# Patient Record
Sex: FEMALE | Race: WHITE | NOT HISPANIC OR LATINO | URBAN - METROPOLITAN AREA
[De-identification: names, ages, dates, MRNs, and addresses within clinical notes are randomized per-mention and may not be internally consistent; named-entity substitution may affect disease eponyms.]

---

## 2022-11-29 ENCOUNTER — OUTPATIENT (OUTPATIENT)
Dept: OUTPATIENT SERVICES | Facility: HOSPITAL | Age: 32
LOS: 1 days | End: 2022-11-29

## 2022-11-29 ENCOUNTER — APPOINTMENT (OUTPATIENT)
Dept: ULTRASOUND IMAGING | Facility: CLINIC | Age: 32
End: 2022-11-29

## 2022-11-29 PROCEDURE — 76856 US EXAM PELVIC COMPLETE: CPT | Mod: 26

## 2022-11-29 PROCEDURE — 76700 US EXAM ABDOM COMPLETE: CPT | Mod: 26

## 2022-11-29 PROCEDURE — 76536 US EXAM OF HEAD AND NECK: CPT | Mod: 26

## 2022-12-06 RX ORDER — SODIUM CHLORIDE 9 MG/ML
1000 INJECTION, SOLUTION INTRAVENOUS
Refills: 0 | Status: DISCONTINUED | OUTPATIENT
Start: 2022-12-07 | End: 2022-12-07

## 2022-12-06 RX ORDER — FENTANYL CITRATE 50 UG/ML
25 INJECTION INTRAVENOUS
Refills: 0 | Status: DISCONTINUED | OUTPATIENT
Start: 2022-12-07 | End: 2022-12-07

## 2022-12-06 NOTE — ASU PATIENT PROFILE, ADULT - NS PREOP UNDERSTANDS INFO
No solid food/dairy/candy/gum or after 9:30pm tonight, water before 04:30am tomorrow, reminded patient to bring photo ID/insurance card, escort must have photo ID. No jewelries/valuables/contact lens; no smoking/alcohol/drug use. Address and call back number given./yes

## 2022-12-07 ENCOUNTER — TRANSCRIPTION ENCOUNTER (OUTPATIENT)
Age: 32
End: 2022-12-07

## 2022-12-07 ENCOUNTER — OUTPATIENT (OUTPATIENT)
Dept: OUTPATIENT SERVICES | Facility: HOSPITAL | Age: 32
LOS: 1 days | Discharge: HOME | End: 2022-12-07

## 2022-12-07 ENCOUNTER — RESULT REVIEW (OUTPATIENT)
Age: 32
End: 2022-12-07

## 2022-12-07 VITALS
RESPIRATION RATE: 15 BRPM | DIASTOLIC BLOOD PRESSURE: 72 MMHG | HEART RATE: 72 BPM | OXYGEN SATURATION: 98 % | TEMPERATURE: 98 F | SYSTOLIC BLOOD PRESSURE: 118 MMHG

## 2022-12-07 VITALS
HEIGHT: 67 IN | RESPIRATION RATE: 17 BRPM | OXYGEN SATURATION: 100 % | HEART RATE: 81 BPM | WEIGHT: 163.14 LBS | SYSTOLIC BLOOD PRESSURE: 131 MMHG | DIASTOLIC BLOOD PRESSURE: 81 MMHG | TEMPERATURE: 98 F

## 2022-12-07 DIAGNOSIS — Z90.49 ACQUIRED ABSENCE OF OTHER SPECIFIED PARTS OF DIGESTIVE TRACT: Chronic | ICD-10-CM

## 2022-12-07 LAB — SARS-COV-2 RNA SPEC QL NAA+PROBE: NEGATIVE — SIGNIFICANT CHANGE UP

## 2022-12-07 PROCEDURE — 88300 SURGICAL PATH GROSS: CPT | Mod: 26

## 2022-12-07 DEVICE — SURGIFLO HEMOSTATIC MATRIX KIT: Type: IMPLANTABLE DEVICE | Status: FUNCTIONAL

## 2022-12-07 DEVICE — STAPLER SEPTAL ENTACT: Type: IMPLANTABLE DEVICE | Status: FUNCTIONAL

## 2022-12-07 RX ORDER — ACETAMINOPHEN 500 MG
1000 TABLET ORAL ONCE
Refills: 0 | Status: COMPLETED | OUTPATIENT
Start: 2022-12-07 | End: 2022-12-07

## 2022-12-07 RX ORDER — L.ACIDOPH/B.ANIMALIS/B.LONGUM 15B CELL
2 CAPSULE ORAL
Qty: 0 | Refills: 0 | DISCHARGE

## 2022-12-07 RX ORDER — APREPITANT 80 MG/1
40 CAPSULE ORAL ONCE
Refills: 0 | Status: COMPLETED | OUTPATIENT
Start: 2022-12-07 | End: 2022-12-07

## 2022-12-07 RX ORDER — OXYCODONE HYDROCHLORIDE 5 MG/1
5 TABLET ORAL ONCE
Refills: 0 | Status: DISCONTINUED | OUTPATIENT
Start: 2022-12-07 | End: 2022-12-07

## 2022-12-07 RX ADMIN — OXYCODONE HYDROCHLORIDE 5 MILLIGRAM(S): 5 TABLET ORAL at 13:10

## 2022-12-07 RX ADMIN — APREPITANT 40 MILLIGRAM(S): 80 CAPSULE ORAL at 08:52

## 2022-12-07 RX ADMIN — Medication 1000 MILLIGRAM(S): at 08:52

## 2022-12-07 RX ADMIN — FENTANYL CITRATE 25 MICROGRAM(S): 50 INJECTION INTRAVENOUS at 12:33

## 2022-12-07 RX ADMIN — SODIUM CHLORIDE 100 MILLILITER(S): 9 INJECTION, SOLUTION INTRAVENOUS at 12:06

## 2022-12-07 RX ADMIN — OXYCODONE HYDROCHLORIDE 5 MILLIGRAM(S): 5 TABLET ORAL at 13:40

## 2022-12-07 RX ADMIN — FENTANYL CITRATE 25 MICROGRAM(S): 50 INJECTION INTRAVENOUS at 12:18

## 2022-12-07 NOTE — ASU DISCHARGE PLAN (ADULT/PEDIATRIC) - NS MD DC FALL RISK RISK
For information on Fall & Injury Prevention, visit: [email or web address] OR  [email or web address] OR  https://www.cdc.gov/steadi/patient.html

## 2022-12-07 NOTE — BRIEF OPERATIVE NOTE - NSICDXBRIEFPREOP_GEN_ALL_CORE_FT
PRE-OP DIAGNOSIS:  Nasal septal deviation 07-Dec-2022 11:44:44  Joon Birmingham  Nasal turbinate hypertrophy 07-Dec-2022 11:44:52  Joon Birmingham

## 2022-12-07 NOTE — BRIEF OPERATIVE NOTE - NSICDXBRIEFPROCEDURE_GEN_ALL_CORE_FT
PROCEDURES:  Septoplasty, turbinate reduction 07-Dec-2022 11:44:00  Joon Birmingham  Bilateral turbinectomy 07-Dec-2022 11:44:11  Joon Birmingham

## 2022-12-07 NOTE — ASU DISCHARGE PLAN (ADULT/PEDIATRIC) - CARE PROVIDER_API CALL
Joon Birmingham)  Otolaryngology Otolaryngology  Head and Neck Surgery  [address] [place] [address], [address] [place] [address]  Phone: [phone]  Fax: [phone]  Follow Up Time:

## 2022-12-07 NOTE — BRIEF OPERATIVE NOTE - NSICDXBRIEFPOSTOP_GEN_ALL_CORE_FT
POST-OP DIAGNOSIS:  Deviated septum 07-Dec-2022 11:45:00  Joon Birmingham  Nasal turbinate hypertrophy 07-Dec-2022 11:45:05  Joon Birmingham

## 2022-12-08 ENCOUNTER — EMERGENCY (EMERGENCY)
Facility: HOSPITAL | Age: 32
LOS: 1 days | Discharge: HOME | End: 2022-12-08
Admitting: EMERGENCY MEDICINE

## 2022-12-08 VITALS
HEIGHT: 67 IN | SYSTOLIC BLOOD PRESSURE: 131 MMHG | HEART RATE: 96 BPM | RESPIRATION RATE: 18 BRPM | DIASTOLIC BLOOD PRESSURE: 84 MMHG | OXYGEN SATURATION: 99 % | TEMPERATURE: 98 F

## 2022-12-08 VITALS
TEMPERATURE: 98 F | DIASTOLIC BLOOD PRESSURE: 62 MMHG | SYSTOLIC BLOOD PRESSURE: 109 MMHG | HEART RATE: 78 BPM | RESPIRATION RATE: 17 BRPM | OXYGEN SATURATION: 96 %

## 2022-12-08 DIAGNOSIS — Z90.89 ACQUIRED ABSENCE OF OTHER ORGANS: ICD-10-CM

## 2022-12-08 DIAGNOSIS — R04.0 EPISTAXIS: ICD-10-CM

## 2022-12-08 DIAGNOSIS — Z90.49 ACQUIRED ABSENCE OF OTHER SPECIFIED PARTS OF DIGESTIVE TRACT: Chronic | ICD-10-CM

## 2022-12-08 DIAGNOSIS — Z98.890 OTHER SPECIFIED POSTPROCEDURAL STATES: ICD-10-CM

## 2022-12-08 PROBLEM — Z78.9 OTHER SPECIFIED HEALTH STATUS: Chronic | Status: ACTIVE | Noted: 2022-12-07

## 2022-12-08 LAB
APTT BLD: 32 SEC — SIGNIFICANT CHANGE UP (ref 27.5–35.5)
BASOPHILS # BLD AUTO: 0.02 K/UL — SIGNIFICANT CHANGE UP (ref 0–0.2)
BASOPHILS NFR BLD AUTO: 0.1 % — SIGNIFICANT CHANGE UP (ref 0–2)
EOSINOPHIL # BLD AUTO: 0 K/UL — SIGNIFICANT CHANGE UP (ref 0–0.5)
EOSINOPHIL NFR BLD AUTO: 0 % — SIGNIFICANT CHANGE UP (ref 0–6)
HCT VFR BLD CALC: 34.5 % — SIGNIFICANT CHANGE UP (ref 34.5–45)
HGB BLD-MCNC: 11.7 G/DL — SIGNIFICANT CHANGE UP (ref 11.5–15.5)
IMM GRANULOCYTES NFR BLD AUTO: 0.4 % — SIGNIFICANT CHANGE UP (ref 0–0.9)
INR BLD: 1.08 — SIGNIFICANT CHANGE UP (ref 0.88–1.16)
LYMPHOCYTES # BLD AUTO: 1.47 K/UL — SIGNIFICANT CHANGE UP (ref 1–3.3)
LYMPHOCYTES # BLD AUTO: 7 % — LOW (ref 13–44)
MCHC RBC-ENTMCNC: 32.1 PG — SIGNIFICANT CHANGE UP (ref 27–34)
MCHC RBC-ENTMCNC: 33.9 GM/DL — SIGNIFICANT CHANGE UP (ref 32–36)
MCV RBC AUTO: 94.5 FL — SIGNIFICANT CHANGE UP (ref 80–100)
MONOCYTES # BLD AUTO: 1.01 K/UL — HIGH (ref 0–0.9)
MONOCYTES NFR BLD AUTO: 4.8 % — SIGNIFICANT CHANGE UP (ref 2–14)
NEUTROPHILS # BLD AUTO: 18.41 K/UL — HIGH (ref 1.8–7.4)
NEUTROPHILS NFR BLD AUTO: 87.7 % — HIGH (ref 43–77)
NRBC # BLD: 0 /100 WBCS — SIGNIFICANT CHANGE UP (ref 0–0)
PLATELET # BLD AUTO: 256 K/UL — SIGNIFICANT CHANGE UP (ref 150–400)
PROTHROM AB SERPL-ACNC: 12.7 SEC — SIGNIFICANT CHANGE UP (ref 10.5–13.4)
RBC # BLD: 3.65 M/UL — LOW (ref 3.8–5.2)
RBC # FLD: 12.1 % — SIGNIFICANT CHANGE UP (ref 10.3–14.5)
WBC # BLD: 21 K/UL — HIGH (ref 3.8–10.5)
WBC # FLD AUTO: 21 K/UL — HIGH (ref 3.8–10.5)

## 2022-12-08 PROCEDURE — 99284 EMERGENCY DEPT VISIT MOD MDM: CPT

## 2022-12-08 RX ORDER — AMPICILLIN SODIUM AND SULBACTAM SODIUM 250; 125 MG/ML; MG/ML
3 INJECTION, POWDER, FOR SUSPENSION INTRAMUSCULAR; INTRAVENOUS ONCE
Refills: 0 | Status: COMPLETED | OUTPATIENT
Start: 2022-12-08 | End: 2022-12-08

## 2022-12-08 RX ORDER — TRANEXAMIC ACID 100 MG/ML
1000 INJECTION, SOLUTION INTRAVENOUS ONCE
Refills: 0 | Status: COMPLETED | OUTPATIENT
Start: 2022-12-08 | End: 2022-12-08

## 2022-12-08 RX ORDER — OXYMETAZOLINE HYDROCHLORIDE 0.5 MG/ML
1 SPRAY NASAL ONCE
Refills: 0 | Status: COMPLETED | OUTPATIENT
Start: 2022-12-08 | End: 2022-12-08

## 2022-12-08 RX ORDER — ONDANSETRON 8 MG/1
4 TABLET, FILM COATED ORAL ONCE
Refills: 0 | Status: COMPLETED | OUTPATIENT
Start: 2022-12-08 | End: 2022-12-08

## 2022-12-08 RX ORDER — TRANEXAMIC ACID 100 MG/ML
5 INJECTION, SOLUTION INTRAVENOUS ONCE
Refills: 0 | Status: COMPLETED | OUTPATIENT
Start: 2022-12-08 | End: 2022-12-08

## 2022-12-08 RX ADMIN — ONDANSETRON 4 MILLIGRAM(S): 8 TABLET, FILM COATED ORAL at 05:55

## 2022-12-08 RX ADMIN — TRANEXAMIC ACID 220 MILLIGRAM(S): 100 INJECTION, SOLUTION INTRAVENOUS at 05:13

## 2022-12-08 RX ADMIN — OXYMETAZOLINE HYDROCHLORIDE 1 SPRAY(S): 0.5 SPRAY NASAL at 07:20

## 2022-12-08 RX ADMIN — AMPICILLIN SODIUM AND SULBACTAM SODIUM 3 GRAM(S): 250; 125 INJECTION, POWDER, FOR SUSPENSION INTRAMUSCULAR; INTRAVENOUS at 06:55

## 2022-12-08 RX ADMIN — TRANEXAMIC ACID 1000 MILLIGRAM(S): 100 INJECTION, SOLUTION INTRAVENOUS at 05:53

## 2022-12-08 RX ADMIN — AMPICILLIN SODIUM AND SULBACTAM SODIUM 200 GRAM(S): 250; 125 INJECTION, POWDER, FOR SUSPENSION INTRAMUSCULAR; INTRAVENOUS at 06:13

## 2022-12-08 RX ADMIN — TRANEXAMIC ACID 5 MILLILITER(S): 100 INJECTION, SOLUTION INTRAVENOUS at 05:40

## 2022-12-08 NOTE — ED PROVIDER NOTE - PATIENT PORTAL LINK FT
You can access the FollowMyHealth Patient Portal offered by [facility] by registering at the following website: [email or web address]. By joining [email or web address]’s FollowMyHealth portal, you will also be able to view your health information using other applications (apps) compatible with our system.

## 2022-12-08 NOTE — ED ADULT NURSE NOTE - OBJECTIVE STATEMENT
Pt is here s/p nasal surgery for which she was released from [facility]'s PACU despite still having some bleeding as well as bleeding via the eyes after her procedure. Her nasal wound reopened, causing uncontrolled bleeding. Pt is otherwise feeling well, no lightheadedness (endorses a HA), no dizziness, visual changes, CP, SOB.

## 2022-12-08 NOTE — ED PROVIDER NOTE - CLINICAL SUMMARY MEDICAL DECISION MAKING FREE TEXT BOX
32 yo F POD1 s/p septoplasty w b/l inferior turbinectomy at [facility] by Dr. Birmingham, noted persistent bleeding post op and at home. Afebrile, non toxic appearing, no h/o bleeding/clotting disorder, exam noted persistent venous bleeding from b/l nare, L>R, prior silicone packing prematurely fell out. labs here with stable H/H, elevated wbc to 21K likely post op and pt already been started on amoxicillin or augmentin. coags wnl. Multiple attempted performed for bleeding control, including topical afrin/TXA and lido/epi gentle packing with dental rolls, noted persistent bleeding and subsequently given dose of IV TXA and gently packed with surgicel fibrillar foams with adequate control of bleeding. Monitored in the ED for 2 hours with no active bleeding noted, s/p dose of IV unasyn s/p post op with leukocytosis and packing. Attempted to reach Dr. Birmingham multiple times via call center and messaging with no call back. Case discussed with Dr. Christy (ENT on call) via CTC, medically cleared for d/c with close f/u with Dr. Birmingham. Mother also attempted to call with no answer. Will take pt to [facility] or Dr. Birmingham's office today for reassessment. Strict return precautions discussed, both verbalized understanding

## 2022-12-08 NOTE — ED PROVIDER NOTE - PHYSICAL EXAMINATION
Vital Signs - nursing notes reviewed and confirmed  Gen - WDWN F, NAD, comfortable and non-toxic appearing, speaking in full sentences   Skin - warm, dry, intact  HEENT - AT/NC, PERRL, sclera clear,  moist oral mucosa, o/p clear with no erythema, edema, or exudate, uvula midline, airway patent, neck supple and NT, FROM  CV - S1S2, R/R/R  Resp - respiration non-labored, CTAB, symmetric bs b/l, no r/r/w  GI - NABS, soft, ND, NT, no rebound or guarding, no CVAT b/l   MS - w/w/p, no c/c/e, calves supple and NT, distal pulses symmetric b/l, brisk cap refills, +SILT, NV intact, FROM, compartment soft  Neuro - AxOx3, no focal neuro deficits, CN II-XII grossly intact, fluent speech, cerebellar function intact, negative pronator drift, negative nystagmus, ambulatory without gait disturbance  Psych - Cooperative, appropriate mood, language/behaviors Vital Signs - nursing notes reviewed and confirmed  Gen - WDWN F, NAD, comfortable and non-toxic appearing, speaking in full sentences   Skin - warm, dry, intact  HEENT - AT/NC, PERRL, sclera clear,  moist oral mucosa, large clots to b/l nares with foam like material in the R nare, L nare packing appears to be dislodged, +Active venous bleeding, o/p clear with no erythema, edema, or exudate, uvula midline, airway patent, neck supple and NT, FROM  CV - S1S2, R/R/R  Resp - respiration non-labored, CTAB, symmetric bs b/l, no r/r/w  GI - NABS, soft, ND, NT, no rebound or guarding, no CVAT b/l   MS - w/w/p, no c/c/e, calves supple and NT, distal pulses symmetric b/l, brisk cap refills, +SILT, NV intact, FROM, compartment soft  Neuro - AxOx3, no focal neuro deficits, CN II-XII grossly intact, fluent speech, cerebellar function intact, negative pronator drift, negative nystagmus, ambulatory without gait disturbance  Psych - Cooperative, appropriate mood, language/behaviors

## 2022-12-08 NOTE — ED ADULT NURSE NOTE - CHIEF COMPLAINT QUOTE
walk in pt with complaints of nose bleed since 1130pm. Pt underwent septoplasty and bilateral inferior turbinectomy at [facility] yesterday. Last oxycodone at 10pm. Pt states silicone was placed in both nares and she feels that the piece in left nare is 'coming out.' Admits to slight dizziness and feeling 'faint.'

## 2022-12-08 NOTE — ED PROVIDER NOTE - NSFOLLOWUPCLINICS_GEN_ALL_ED_FT
[facility]  ENT  [address] [place] [address]  Phone: [phone]  Fax: [phone]    [facility]  Otolaryngology (ENT)  [address], [address]  [place] [address]  Phone: [phone]  Fax:

## 2022-12-08 NOTE — ED PROVIDER NOTE - OBJECTIVE STATEMENT
30 yo F with no known PMHx, no bleeding/clotting d/o, POD1 s/p septoplasty w b/l inferior turbinectomy at [facility] by Dr. Birmingham, BIB mother c/o persistent nosebleed post surgery. Pt reports having persistent bleeding post op and in the PACU. b/l nares were packed with silicone thrombin materials but noted L one sliding out and with persistent bleeding since 11:30 pm. Now feeling lightheaded with 32 yo F with no known PMHx, no bleeding/clotting d/o, POD1 s/p septoplasty w b/l inferior turbinectomy at [facility] by Dr. Birmingham, BIB mother c/o persistent nosebleed post surgery. Pt reports having persistent bleeding from the nose and eyes post op and in the PACU. Was seen by RN and surgeon with b/l nares silicone thrombin materials prior to discharge around 5pm.  Reports L one sliding out and with persistent bleeding since 11:30 pm. Now feeling lightheaded with nausea and difficulty breathing through the nares. Denies trauma, HA, LOC, rhinorrhea, hematemesis, rash, fever, chills, CP, SOB, palpitations, hemoptysis, wheezing, stridors, change in phonation, N/V, abdominal pain, and focal weakness. No family h/o bleeding/clotting disorder noted

## 2022-12-08 NOTE — ED PROVIDER NOTE - CARE PROVIDER_API CALL
Joon Birmingham)  Otolaryngology Otolaryngology  Head and Neck Surgery  [address] [place] [address], [address] [place] [address]  Phone: [phone]  Fax: [phone]  Follow Up Time:

## 2022-12-08 NOTE — ED PROVIDER NOTE - NSFOLLOWUPINSTRUCTIONS_ED_ALL_ED_FT
Nosebleed    WHAT YOU NEED TO KNOW:    A nosebleed, or epistaxis, occurs when one or more of the blood vessels in your nose break. You may have dark or bright red blood from one or both nostrils. A nosebleed is most commonly caused by dry air or picking your nose. A direct blow to your nose, irritation from a cold or allergies, or a foreign object can also cause a nosebleed.     DISCHARGE INSTRUCTIONS:    Return to the emergency department if:   •Your nasal packing is soaked with blood.      •Your nose is still bleeding after 20 minutes, even after you pinch it.       •You have a foul-smelling discharge coming out of your nose.      •You feel so weak and dizzy that you have trouble standing up.      •You have trouble breathing or talking.       Contact your healthcare provider if:   •You have a fever and are vomiting.      •You have pain in and around your nose that is getting worse even after you take pain medicines.      •Your nasal pack is loose.      •You have questions or concerns about your condition or care.      First aid:   •Sit up and lean forward. This will help prevent you from swallowing blood. Spit blood and saliva into a bowl.       •Apply pressure to your nose. Use 2 fingers to pinch your nose shut for 10       •Apply ice on the bridge of your nose to decrease swelling and bleeding. Use a cold pack or put crushed ice in a plastic bag. Cover it with a towel to protect your skin.      •Pack your nose with a cotton ball, tissue, tampon, or gauze bandage to stop the bleeding.      Medicines:   •Medicines applied to a small piece of cotton and placed in your nose. Medicine may also be sprayed in or applied directly to your nose. You may need medicine to prevent an infection. If bleeding is severe, medicine may be injected into a blood vessel in your nose.       •Take your medicine as directed. Contact your healthcare provider if you think your medicine is not helping or if you have side effects. Tell your provider if you are allergic to any medicine. Keep a list of the medicines, vitamins, and herbs you take. Include the amounts, and when and why you take them. Bring the list or the pill bottles to follow-up visits. Carry your medicine list with you in case of an emergency.      Prevent another nosebleed:   •Keep your nose moist. Put a small amount of petroleum jelly inside your nostrils as needed. Use a saline (saltwater) nasal spray. Do not put anything else inside your nose unless your healthcare provider says it is okay. Do not use oil-based lubricants if you use oxygen therapy. They may be flammable.      •Use a cool mist humidifier to increase air moisture in your home. This will help your nose stay moist.       •Do not pick or blow your nose for at least a week. You can irritate or damage your nose if you pick it. Blowing your nose too hard may cause the bleeding to start again. Do not bend over or strain as this can cause the bleeding to start again.      •Avoid irritants such as tobacco smoke or chemical sprays such as .      Follow up with your healthcare provider as directed: Any packing in your nose should be removed within 2 to 3 days. Write down your questions so you remember to ask them during your visits.       Septoplasty, Care After      The following information offers guidance on how to care for yourself after your procedure. Your health care provider may also give you more specific instructions. If you have problems or questions, contact your health care provider.      What can I expect after the procedure?    After the procedure, it is common to have:  •A mild headache.      •A stuffy nose.      •A feeling of fullness in your ears.      •Bloody fluid coming from your nose.        Follow these instructions at home:      Medicines   A comparison of three sample cups showing dark yellow, yellow, and pale yellow urine.   •Take over-the-counter and prescription medicines only as told by your health care provider.      •If you were prescribed an antibiotic medicine, take it as told by your health care provider. Do not stop using the antibiotic even if you start to feel better.    •Ask your health care provider if the medicine prescribed to you:  •Requires you to avoid driving or using machinery.    •Can cause constipation. You may need to take these actions to prevent or treat constipation:  •Drink enough fluid to keep your urine pale yellow.       •Take over-the-counter or prescription medicines.      •Eat foods that are high in fiber, such as beans, whole grains, and fresh fruits and vegetables.      •Limit foods that are high in fat and processed sugars, such as fried or sweet foods.             What to avoid   A sign showing that a person should not lift anything heavy.   •Avoid eating hot and spicy foods for several days after surgery or as told by your health care provider.      • Do not blow your nose for 2 weeks after surgery or as told by your health care provider.      •Avoid strenuous activities for 2 weeks. These include activities such as running or playing sports. These activities can cause nosebleeds.      •Avoid straining when having a bowel movement. Straining can cause a nosebleed.      •Avoid very hot or steamy showers for several days after surgery or as told by your health care provider.      • Do not lift anything that is heavier than 10 lb (4.5 kg) until your health care provider says that it is safe.        General instructions   Outline of person in bed with head of bed raised.   •You may be asked to clean your nostrils with an over-the-counter saline nasal spray. This will help to clear the crusts and blood clots in your nose. Use it as told by your health care provider.      •Raise (elevate) your head while you are lying down.      •If you have nasal splints, follow your health care provider's instructions about removal.      •If your nose was packed with gauze, follow your health care provider's instructions about removal.      • Do not push a dressing into your nose or replace the removable packing after it has been removed.      •Keep all follow-up visits. This is important.        Contact a health care provider if you:    •Develop swelling or increased pain in your nose.      •Have yellowish-white fluid (pus) coming from your nose.      •Have a fever.      •Have nausea that does not go away.      •Cannot breathe through your nose after nasal packing and splints have been removed.        Get help right away if you:    •Are short of breath.      •Feel dizzy or you faint.      •Have vision changes.      •Are bleeding heavily from your nose.      •Vomit every time you eat or drink.      •Have a severe headache or a stiff neck.        Summary    •After the procedure, it is common to have a mild headache, stuffy nose, feeling of fullness in your ears, and bloody fluid coming from your nose.      •Follow instructions from your health care provider about food and fluids that you should avoid.      • Do not blow your nose for 2 weeks after the surgery.      • Do not lift anything that is heavier than 10 lb (4.5 kg) until your health care provider says that it is safe

## 2022-12-08 NOTE — ED PROCEDURE NOTE - PROCEDURE ADDITIONAL DETAILS
pt with persistent bleeding from b/l nares s/p septoplasty w b/l inferior turbinectomy, noted silicone packing in b/l nare, L one prematurely fell out and pt with persistent bleeding. Noted recently surgical procedure, no packing placed, topical TXA with surgicel fibrillar foam placed gently, monitored in the ED for 30 mins post procedure with no active bleeding noted , airway patent, tolerating po without N/V, AFVSS

## 2022-12-08 NOTE — ED ADULT TRIAGE NOTE - CHIEF COMPLAINT QUOTE
walk in pt with complaints of nose bleed since 1130pm. Pt underwent septoplasty and bilateral inferior turbinectomy at [facility] yesterday. Last oxycodone at 10pm. Pt states silicone was placed in both nares and she feels that the piece in left nare is 'coming out.' Admits to slight dizziness and feeling 'faint.'

## 2022-12-08 NOTE — ED ADULT NURSE REASSESSMENT NOTE - NS ED NURSE REASSESS COMMENT FT1
Pt reports nausea, zofran given as per MAR  also reporting more blood from left nare, provider made aware
Transfer of care acknowledged with bedside rounding, lab results reviewed, will continue to monitor.  pt has nasal packing in place  TXA given as per MAR  in nad

## 2022-12-14 LAB — SURGICAL PATHOLOGY STUDY: SIGNIFICANT CHANGE UP

## 2023-09-19 NOTE — ED PROVIDER NOTE - CCCP TRG CHIEF CMPLNT
nose bleed Z Plasty Text: The lesion was extirpated to the level of the fat with a #15 scalpel blade.  Given the location of the defect, shape of the defect and the proximity to free margins a Z-plasty was deemed most appropriate for repair.  Using a sterile surgical marker, the appropriate transposition arms of the Z-plasty were drawn incorporating the defect and placing the expected incisions within the relaxed skin tension lines where possible.    The area thus outlined was incised deep to adipose tissue with a #15 scalpel blade.  The skin margins were undermined to an appropriate distance in all directions utilizing iris scissors.  The opposing transposition arms were then transposed into place in opposite direction and anchored with interrupted buried subcutaneous sutures.

## (undated) DEVICE — SUT SILK 2-0 18" FS

## (undated) DEVICE — SOL INJ NS 0.9% 1000ML

## (undated) DEVICE — WARMING BLANKET LOWER ADULT

## (undated) DEVICE — GLV 7.5 PROTEXIS (WHITE)

## (undated) DEVICE — DRSG MEROCEL SPLINT SILICONE

## (undated) DEVICE — SHAVER BLADE OLYMPUS DIEGO ELITE BIPOLAR STRAIGHT 2MM

## (undated) DEVICE — PACK RHINOPLASTY

## (undated) DEVICE — SUT CHROMIC 4-0 18" G-3

## (undated) DEVICE — SLV COMPRESSION KNEE MED

## (undated) DEVICE — TUBING DIEGO DECLOG

## (undated) DEVICE — DRSG 2X2